# Patient Record
Sex: FEMALE | Race: WHITE
[De-identification: names, ages, dates, MRNs, and addresses within clinical notes are randomized per-mention and may not be internally consistent; named-entity substitution may affect disease eponyms.]

---

## 2018-02-16 ENCOUNTER — HOSPITAL ENCOUNTER (OUTPATIENT)
Dept: HOSPITAL 25 - ED | Age: 53
Setting detail: OBSERVATION
LOS: 1 days | Discharge: HOME | DRG: 203 | End: 2018-02-17
Attending: HOSPITALIST | Admitting: HOSPITALIST
Payer: COMMERCIAL

## 2018-02-16 DIAGNOSIS — Z88.0: ICD-10-CM

## 2018-02-16 DIAGNOSIS — I21.4: Primary | ICD-10-CM

## 2018-02-16 DIAGNOSIS — G50.0: ICD-10-CM

## 2018-02-16 LAB
BASOPHILS # BLD AUTO: 0.1 10^3/UL (ref 0–0.2)
EOSINOPHIL # BLD AUTO: 0.1 10^3/UL (ref 0–0.6)
HCT VFR BLD AUTO: 42 % (ref 35–47)
HGB BLD-MCNC: 14.3 G/DL (ref 12–16)
LYMPHOCYTES # BLD AUTO: 2.5 10^3/UL (ref 1–4.8)
MCH RBC QN AUTO: 29 PG (ref 27–31)
MCHC RBC AUTO-ENTMCNC: 34 G/DL (ref 31–36)
MCV RBC AUTO: 84 FL (ref 80–97)
MONOCYTES # BLD AUTO: 0.7 10^3/UL (ref 0–0.8)
NEUTROPHILS # BLD AUTO: 4 10^3/UL (ref 1.5–7.7)
NRBC # BLD AUTO: 0 10^3/UL
NRBC BLD QL AUTO: 0
PLATELET # BLD AUTO: 360 10^3/UL (ref 150–450)
RBC # BLD AUTO: 4.99 10^6/UL (ref 4–5.4)
WBC # BLD AUTO: 7.5 10^3/UL (ref 3.5–10.8)

## 2018-02-16 PROCEDURE — 80053 COMPREHEN METABOLIC PANEL: CPT

## 2018-02-16 PROCEDURE — 36415 COLL VENOUS BLD VENIPUNCTURE: CPT

## 2018-02-16 PROCEDURE — 85025 COMPLETE CBC W/AUTO DIFF WBC: CPT

## 2018-02-16 PROCEDURE — 85652 RBC SED RATE AUTOMATED: CPT

## 2018-02-16 PROCEDURE — 84443 ASSAY THYROID STIM HORMONE: CPT

## 2018-02-16 PROCEDURE — 71045 X-RAY EXAM CHEST 1 VIEW: CPT

## 2018-02-16 PROCEDURE — 84484 ASSAY OF TROPONIN QUANT: CPT

## 2018-02-16 PROCEDURE — 99284 EMERGENCY DEPT VISIT MOD MDM: CPT

## 2018-02-16 PROCEDURE — 93005 ELECTROCARDIOGRAM TRACING: CPT

## 2018-02-16 PROCEDURE — 86140 C-REACTIVE PROTEIN: CPT

## 2018-02-16 PROCEDURE — 83735 ASSAY OF MAGNESIUM: CPT

## 2018-02-16 NOTE — ED
Artem HARRY Tecjoon, scribed for Drake Brown MD on 02/16/18 at 2222 .





HPI Chest Pain





- HPI Summary


HPI Summary: 





This patient is a 52 year old female presenting to [facility] with a chief complaint 

of chest pain since approx. 1300 today. The pain is localized in her upper 

right chest and radiates to her back. Patient states that it is a weird 

twitching sensation. The pain is intermittent. The pain is rated 2/10 in 

severity. Symptoms aggravated by nothing. Symptoms alleviated by laying prone 

on stomach. Patient denies fever, SOB. Patient states she has a history of 

chest pain related to anxiety.





- History of Current Complaint


Chief Complaint: EDChestPainROMI


Time Seen by Provider: 02/16/18 21:52


Hx Obtained From: Patient


Onset/Duration: Started Hours Ago, Resolved


Timing: Intermittent


Initial Severity: Mild


Current Severity: None


Pain Intensity: 2


Pain Scale Used: 0-10 Numeric


Chest Pain Location: Upper Sternal


Chest Pain Radiates: Yes


Aggravating Factor(s): Nothing


Alleviating Factor(s): Position - lying on stomach


Associated Signs and Symptoms: Positive: Negative - fever, SOB





- Allergy/Home Medications


Allergies/Adverse Reactions: 


 Allergies











Allergy/AdvReac Type Severity Reaction Status Date / Time


 


Penicillins Allergy  Unknown Verified 02/16/18 21:41





   Reaction  





   Details  














PMH/Surg Hx/FS Hx/Imm Hx


Previously Healthy: Yes


Opthamlomology History: 


   Denies: Hx Legally Blind


EENT History: 


   Denies: Hx Deafness


Infectious Disease History: No


Infectious Disease History: 


   Denies: Traveled Outside the [place] in Last 30 Days





- Family History


Known Family History: 


   Negative: Hypertension





- Social History


Lives: With Family


Alcohol Use: None


Hx Substance Use: No


Substance Use Type: Reports: None


Hx Tobacco Use: No


Smoking Status (MU): Never Smoked Tobacco





Review of Systems


Negative: Fever


Positive: Chest Pain


Negative: Shortness Of Breath


All Other Systems Reviewed And Are Negative: Yes





Physical Exam





- Summary


Physical Exam Summary: 





VITAL SIGNS: Reviewed.


GENERAL:  Patient is a well-developed and nourished female who is lying 

comfortable in the stretcher. Patient is not in any acute respiratory distress.


HEAD AND FACE: No signs of trauma. No ecchymosis, hematomas or skull 

depressions. No sinus tenderness.


EYES: PERRLA, EOMI x 2, No injected conjunctiva, no nystagmus.


EARS: Hearing grossly intact. Ear canals and tympanic membranes are within 

normal limits.


MOUTH: Oropharynx within normal limits.


NECK: Supple, trachea is midline, no adenopathy, no JVD, no carotid bruit, no c-

spine tenderness, neck with full ROM.


CHEST: Symmetric, no tenderness at palpation


LUNGS: Clear to auscultation bilaterally. No wheezing or crackles.


CVS: Regular rate and rhythm, S1 and S2 present, no murmurs or gallops 

appreciated.


ABDOMEN: Soft, non-tender. No signs of distention. No rebound no guarding, and 

no masses palpated. Bowel sounds are normal.


EXTREMITIES: FROM in all major joints, no edema, no cyanosis or clubbing.


NEURO: Alert and oriented x 3. No acute neurological deficits. Speech is normal 

and follows commands.


SKIN: Dry and warm





Triage Information Reviewed: Yes


Vital Signs On Initial Exam: 


 Initial Vitals











Temp Pulse Resp BP Pulse Ox


 


 98.9 F   87   14   143/87   96 


 


 02/16/18 21:41  02/16/18 21:41  02/16/18 21:41  02/16/18 21:41  02/16/18 21:41











Vital Signs Reviewed: Yes





Diagnostics





- Vital Signs


 Vital Signs











  Temp Pulse Resp BP Pulse Ox


 


 02/16/18 22:00    20  124/72 


 


 02/16/18 21:58    21  131/89 


 


 02/16/18 21:41  98.9 F  87  14  143/87  96














- Laboratory


Lab Results: 


 Lab Results











  02/16/18 02/16/18 Range/Units





  21:58 21:58 


 


WBC  7.5   (3.5-10.8)  10^3/ul


 


RBC  4.99   (4.0-5.4)  10^6/ul


 


Hgb  14.3   (12.0-16.0)  g/dl


 


Hct  42   (35-47)  %


 


MCV  84   (80-97)  fL


 


MCH  29   (27-31)  pg


 


MCHC  34   (31-36)  g/dl


 


RDW  13   (10.5-15)  %


 


Plt Count  360   (150-450)  10^3/ul


 


MPV  8   (7.4-10.4)  um3


 


Neut % (Auto)  54.0   (38-83)  %


 


Lymph % (Auto)  33.8   (25-47)  %


 


Mono % (Auto)  9.5 H   (1-9)  %


 


Eos % (Auto)  2.0   (0-6)  %


 


Baso % (Auto)  0.7   (0-2)  %


 


Absolute Neuts (auto)  4.0   (1.5-7.7)  10^3/ul


 


Absolute Lymphs (auto)  2.5   (1.0-4.8)  10^3/ul


 


Absolute Monos (auto)  0.7   (0-0.8)  10^3/ul


 


Absolute Eos (auto)  0.1   (0-0.6)  10^3/ul


 


Absolute Basos (auto)  0.1   (0-0.2)  10^3/ul


 


Absolute Nucleated RBC  0   10^3/ul


 


Nucleated RBC %  0   


 


Sodium   138  (133-145)  mmol/L


 


Potassium   3.5  (3.5-5.0)  mmol/L


 


Chloride   101  (101-111)  mmol/L


 


Carbon Dioxide   30  (22-32)  mmol/L


 


Anion Gap   7  (2-11)  mmol/L


 


BUN   17  (6-24)  mg/dL


 


Creatinine   0.93  (0.51-0.95)  mg/dL


 


Est GFR ( Amer)   82.1  (>60)  


 


Est GFR (Non-Af Amer)   63.8  (>60)  


 


BUN/Creatinine Ratio   18.3  (8-20)  


 


Glucose   108 H  ()  mg/dL


 


Calcium   9.9  (8.6-10.3)  mg/dL


 


Total Bilirubin   1.10 H  (0.2-1.0)  mg/dL


 


AST   18  (13-39)  U/L


 


ALT   18  (7-52)  U/L


 


Alkaline Phosphatase   97  ()  U/L


 


Troponin I   0.35 H*  (<0.04)  ng/mL


 


Total Protein   7.6  (6.4-8.9)  g/dL


 


Albumin   4.4  (3.2-5.2)  g/dL


 


Globulin   3.2  (2-4)  g/dL


 


Albumin/Globulin Ratio   1.4  (1-3)  











Result Diagrams: 


 02/16/18 21:58





 02/16/18 21:58


Lab Statement: Any lab studies that have been ordered have been reviewed, and 

results considered in the medical decision making process.





- Radiology


  ** CXR


Xray Interpretation: No Acute Changes - CXR reveals, per radiologist, IMPRESSION

: No Acute Process. ED physician has reviewed this radiology report.


Radiology Interpretation Completed By: Radiologist





- EKG


  ** 2144


Cardiac Rate: NL


EKG Rhythm: Sinus Rhythm - 72 BPM


EKG Interpretation: NSR (72 BPM), Normal axis. Normal interval. No ischemic 

changes 





Chest Pain Course/Dx





- Course


Course Of Treatment: This patient is a 52 year old female presenting to [facility] 

with a chief complaint of chest pain since approx. 1300 today. The pain is 

localized in her upper right chest and radiates to her back. The pain is 

intermittent. An EKG, taken 2144, reveals NSR (72 BPM), Normal axis. Normal 

interval. No ischemic changes. CXR reveals, per radiologist, IMPRESSION: No 

Acute Process. ED physician has reviewed this radiology report. Bloodwork 

Obtained. Urinalysis Obtained. In the ED course the patient was given Aspirin, 

Lovenox, Plavix, Lopressor. We discussed patient care with Dr. Hoover and they 

agreed to admit the patient. The patient is agreeable with this plan.





- Chest Pain


Differential Diagnosis/HQI/PQRI: Acute MI - NSTEMI





- Diagnoses


Provider Diagnoses: 


 Non-STEMI (non-ST elevated myocardial infarction)








- Provider Notifications


Discussed Care Of Patient With: Alicia Hoover


Time Discussed With Above Provider: 23:07 - We discussed patient care with Dr. Hoover and they agreed to admit the patient.


Instructed by Provider To: Admit As Inpatient





Discharge





- Discharge Plan


Condition: Fair


Disposition: ADMITTED TO [facility]


Referrals: 


Jesenia Miguel MD [Primary Care Provider] - 





The documentation as recorded by the Artem neves Tecjoon accurately reflects 

the service I personally performed and the decisions made by Kevin ansari Abdul, MD.

## 2018-02-17 VITALS — SYSTOLIC BLOOD PRESSURE: 108 MMHG | DIASTOLIC BLOOD PRESSURE: 73 MMHG

## 2018-02-17 NOTE — RAD
HISTORY: Chest pain



COMPARISONS: July 22, 2007



VIEWS: 1: frontal portable view of the chest at 10:24 PM



FINDINGS:

LINES AND TUBES: None.

CARDIOMEDIASTINAL SILHOUETTE: The cardiomediastinal silhouette is normal for portable

technique.

PLEURA: The costophrenic angles are sharp. No pleural abnormalities are noted.

LUNG PARENCHYMA: The lungs are clear.

ABDOMEN: The upper abdomen is clear. There is no subphrenic gas.

BONES AND SOFT TISSUES: No bone or soft tissue abnormalities are noted.



IMPRESSION: NO ACTIVE CARDIOPULMONARY DISEASE.